# Patient Record
Sex: FEMALE | Race: BLACK OR AFRICAN AMERICAN | NOT HISPANIC OR LATINO | URBAN - METROPOLITAN AREA
[De-identification: names, ages, dates, MRNs, and addresses within clinical notes are randomized per-mention and may not be internally consistent; named-entity substitution may affect disease eponyms.]

---

## 2019-07-04 ENCOUNTER — EMERGENCY (EMERGENCY)
Facility: HOSPITAL | Age: 64
LOS: 1 days | Discharge: HOME | End: 2019-07-04
Attending: EMERGENCY MEDICINE | Admitting: EMERGENCY MEDICINE
Payer: SELF-PAY

## 2019-07-04 VITALS
SYSTOLIC BLOOD PRESSURE: 154 MMHG | DIASTOLIC BLOOD PRESSURE: 89 MMHG | RESPIRATION RATE: 18 BRPM | TEMPERATURE: 98 F | OXYGEN SATURATION: 97 % | HEART RATE: 88 BPM

## 2019-07-04 PROCEDURE — 69209 REMOVE IMPACTED EAR WAX UNI: CPT | Mod: 50

## 2019-07-04 PROCEDURE — 99282 EMERGENCY DEPT VISIT SF MDM: CPT | Mod: 25

## 2019-07-04 NOTE — ED PROVIDER NOTE - NSFOLLOWUPINSTRUCTIONS_ED_ALL_ED_FT
You presented to the ER with ear discomfort, dizziness, hearing loss and unsteady walking. Your vital signs were stable, and your physical exam showed significant earwax obstruction in both of your ears. This is likely what was causing your symptoms. We used saline water flush to remove the earwax from your ears. You no longer need to continue using the debrox drops, but you should still continue to use the antibiotic ear drops that you received at urgent care 4 drops in each ear 4 times a day for 7 days. You will be discharged home and should follow-up with ENT specialist for further evaluation and management within 7-10 days. Return to the ER immediately for any severe or worsening headaches, ear pain, fevers, dizziness/lightheadedness/fainting, vision changes, numbness, weakness, vomiting/inability to eat, or any other new or concerning symptoms.

## 2019-07-04 NOTE — ED ADULT TRIAGE NOTE - CHIEF COMPLAINT QUOTE
Pt c/o "wind in ears" and pain radiating to L neck x 2 weeks. Denies HA, vision changes, fever/chills.

## 2019-07-04 NOTE — ED PROVIDER NOTE - NSFOLLOWUPCLINICS_GEN_ALL_ED_FT
[facility] - ENT  Otolaryngology (ENT)  [address] [place] [address]  Phone: [phone]  Fax:   Follow Up Time: 7-10 Days

## 2019-07-04 NOTE — ED PROVIDER NOTE - CLINICAL SUMMARY MEDICAL DECISION MAKING FREE TEXT BOX
65 y/o F PMH HTN, DM (on metformin), hypothyroid presenting with 2 weeks of bilateral ear discomfort, hearing loss, unsteady gait. Likely 2/2 significant cerumen impaction b/l. Performed cerumen disimpaction bilaterally with saline flush through angiocatheter with 20cc syringe with dislodgement of significant cerumen from each ear. TMs now visible and clear. Some evidence of continues external canal irritation from impaction. Patient with significant symptom relief post-procedure. Will discharge home and advise to continue antibiotic ear drops for 7 days to prevent OE, along with return precautions and ENT follow-up. 65 y/o F PMH HTN, DM (on metformin), hypothyroid presenting with 2 weeks of bilateral ear discomfort, hearing loss, unsteady gait. Likely 2/2 significant cerumen impaction b/l. Performed cerumen disimpaction bilaterally with saline flush through angiocatheter with 20cc syringe with dislodgement of significant cerumen from each ear. TMs now visible and clear. Some evidence of continues external canal irritation from impaction. Patient with significant symptom relief post-procedure. Will discharge home and advise to continue antibiotic ear drops for 7 days to prevent OE, along with return precautions and ENT follow-up.  Normal neuro exam and eye exam, after irrigation pt improved and back to normal

## 2019-07-04 NOTE — ED PROVIDER NOTE - NS ED ROS FT
Constitutional: (+) dizziness, no fever and no chills  Eyes: no discharge, no irritation, no pain, no visual changes  ENMT: (+) "wind" sensation in hear, (+) hearing loss, (+) tinnitus, no ear pain, no dysphagia or throat pain  Neck: no pain, no stiffness, no swollen glands  CV: no chest pain, no palpitations, no edema  Resp: no cough, no shortness of breath  Abd: no abdominal pain, no nausea or vomiting, no diarrhea  : no dysuria, no hematuria  MSK: no back pain, no neck pain, no joint pain  Neuro: no LOC, (+) unsteady gait, no headache, no sensory deficits, no weakness  Skin: no rashes, no lacerations, no lesions Constitutional: (+) dizziness, no fever and no chills  Eyes: no discharge, no irritation, no pain, no visual changes  ENMT: (+) "wind" sensation in hear, (+) hearing loss, (+) tinnitus, no ear pain, no dysphagia or throat pain (+) unsteady gait related to ear symptoms  Neck: no pain, no stiffness, no swollen glands  CV: no chest pain, no palpitations, no edema  Resp: no cough, no shortness of breath  Abd: no abdominal pain, no nausea or vomiting, no diarrhea  : no dysuria, no hematuria  MSK: no back pain, no neck pain, no joint pain  Neuro: no LOC,  no headache, no sensory deficits, no weakness  Skin: no rashes, no lacerations, no lesions    imbalance as related to ears

## 2019-07-04 NOTE — ED PROVIDER NOTE - PHYSICAL EXAMINATION
PHYSICAL EXAM:  GENERAL: Sitting comfortable in bed, in no acute distress  HENMT: Bilateral cerumen impaction, moist mucous membranes, no oropharyngeal exudates or vesicles, uvula is midline  EYES: Clear bilaterally, PERRL, EOMs intact b/l  HEART: RRR, S1/S2, no murmurs noted  RESPIRATORY: Clear to auscultation bilaterally, no wheezes/rhonchi/rales  ABDOMEN: +BS, soft, nontender, nondistended  EXTREMITIES: No lower extremity edema, +2 radial pulses b/l  NEURO:  A&Ox4, no focal motor deficits or sensory deficits, no dysmetria, normal gait  SKIN:  Skin normal color for race, warm, dry and intact. No evidence of rash.

## 2019-07-04 NOTE — ED PROVIDER NOTE - ATTENDING CONTRIBUTION TO CARE
I performed a face to face evaluation of this patient and performed a full history and physical examination on the patient.  I agree with the resident's history, physical examination, and plan of the patient.  63 y/o F PMH HTN, DM (on metformin), hypothyroid presenting with 2 weeks of bilateral ear discomfort, hearing loss, unsteady gait. Likely 2/2 significant cerumen impaction b/l. Performed cerumen disimpaction bilaterally with saline flush through angiocatheter with 20cc syringe with dislodgement of significant cerumen from each ear. TMs now visible and clear. Some evidence of continues external canal irritation from impaction. Patient with significant symptom relief post-procedure. Will discharge home and advise to continue antibiotic ear drops for 7 days to prevent OE, along with return precautions and ENT follow-up.  Normal neuro exam and eye exam, after irrigation pt improved and back to normal.  HEENT TMs not visualized, with cerumen both side ears, neck supple,  eyes wnl, neck supple nontender, heart and lungs wnl, abd soft nontender, neuro wnl, cn2-12 intact strength and sensation wnl

## 2021-07-19 NOTE — ED PROVIDER NOTE - OBJECTIVE STATEMENT
63 y/o F PMH HTN, DM (on metformin), hypothyroid presenting with 2 weeks of bilateral ear discomfort described as "sound of wind in my ears" intermittently, no provoking or relieving factors, without pain, associated with decreased hearing in b/l ears and intermittent mild brief feeling of unsteadiness on feet, and ear ringing. No fevers/chills, HA, vision changes, neck pain, chest pain, SOB, palpitations, abd pain, n/v/d, urinary sxs, joint pain, rash, numbness or weakness. Has been able to walk without assistance. Was seen at urgent care was prescribed debrox drops which pt states just dripped out of her ear, Duration Of Freeze Thaw-Cycle (Seconds): 3 63 y/o F PMH HTN, DM (on metformin), hypothyroid presenting with 2 weeks of bilateral ear discomfort described as "sound of wind in my ears" intermittently, no provoking or relieving factors, without pain, associated with decreased hearing in b/l ears and intermittent mild brief feeling of unsteadiness on feet, and ear ringing. No fevers/chills, HA, vision changes, neck pain, chest pain, SOB, palpitations, abd pain, n/v/d, urinary sxs, joint pain, rash, numbness or weakness. Has been able to walk without assistance. Was seen at urgent care was prescribed debrox drops which pt states just dripped out of her ear and did not help with her symptoms. Number Of Freeze-Thaw Cycles: 3 freeze-thaw cycles Show Applicator Variable?: Yes Render Post-Care Instructions In Note?: no Consent: The patient's consent was obtained including but not limited to risks of crusting, scabbing, blistering, scarring, darker or lighter pigmentary change, recurrence, incomplete removal and infection. Detail Level: Detailed 63 y/o F PMH HTN, DM (on metformin), hypothyroid presenting with 2 weeks of bilateral ear discomfort described as "sound of wind in my ears" intermittently, no provoking or relieving factors, without pain, associated with decreased hearing in b/l ears and intermittent mild brief feeling of unsteadiness on feet, and ear ringing. No fevers/chills, HA, vision changes, neck pain, chest pain, SOB, palpitations, abd pain, n/v/d, urinary sxs, joint pain, rash, numbness or weakness. Has been able to walk without assistance. Was seen at urgent care was prescribed debrox drops which pt states just dripped out of her ear and did not help with her symptoms. No headache, numb, weak Post-Care Instructions: I reviewed with the patient in detail post-care instructions. Patient is to wear sunprotection, and avoid picking at any of the treated lesions. Pt may apply Vaseline to crusted or scabbing areas.

## 2024-07-07 NOTE — ED PROVIDER NOTE - CPE EDP RESP NORM
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 Patient was hypoglycemic at home with glucose in the 40s earlier today with diaphoresis around the same time. She had dizziness at the time that has resolved after glucose improved. Denies f/c/cp/sob/n/v/d. Denies appetite, urinary, or bowel habit changes.     Of note, patient has had multiple recent ER visits and hospitalizations last one discharged on 7/1/24 and was admitted for hypoglycemia. Patient's glucose improved in the ED but she feels uncomfortable going home now. She endorses significant rectal pain with bowel movement with pebbly grainy stool (may benefit from enema). Last chemotherapy was about 1 month ago, next oncology appointment on 7/8/24 in [place].    Upon presentation, VS unremarkable. Labs remarkable for WBC 17.13, H/H 9.7/32.4 (Hgb 10.2 on 6/27/24), Na 133, BUN/Cr 4/0.41, , glucose 44. POCT glucose 48, 198, 116. CXR diffuse pulmonary metastases seen but similar to previous study and no acute findings. CT CAP shows interval placement of a cholecystostomy tube. There are peripherally enhancing collections in the right hemiabdomen as detailed concerning for abscesses. Similar appearance of the numerous pulmonary metastases and hepatic metastases. Small right pleural effusion has increased. Left iliac/iliacus metastasis similar. Retroperitoneal lymph nodes similar. Patient received D10 bolus IV 250cc, Dilaudid 1 IV, Zofran 4 IV. She is admitted to hospital medicine for observation of her hypoglycemia, further management, and care.   normal...